# Patient Record
Sex: FEMALE | Race: WHITE | NOT HISPANIC OR LATINO | URBAN - METROPOLITAN AREA
[De-identification: names, ages, dates, MRNs, and addresses within clinical notes are randomized per-mention and may not be internally consistent; named-entity substitution may affect disease eponyms.]

---

## 2019-01-11 ENCOUNTER — INPATIENT HOSPITAL (OUTPATIENT)
Dept: URBAN - METROPOLITAN AREA HOSPITAL 95 | Facility: HOSPITAL | Age: 34
End: 2019-01-11

## 2019-01-11 DIAGNOSIS — S36.13XA INJURY OF BILE DUCT, INITIAL ENCOUNTER: ICD-10-CM

## 2019-01-11 PROCEDURE — 99223 1ST HOSP IP/OBS HIGH 75: CPT | Mod: 25 | Performed by: INTERNAL MEDICINE

## 2019-01-11 PROCEDURE — 43262 ENDO CHOLANGIOPANCREATOGRAPH: CPT | Mod: XU | Performed by: INTERNAL MEDICINE

## 2019-01-11 PROCEDURE — 43274 ERCP DUCT STENT PLACEMENT: CPT | Performed by: INTERNAL MEDICINE

## 2019-02-13 ENCOUNTER — OFFICE (OUTPATIENT)
Dept: URBAN - METROPOLITAN AREA CLINIC 14 | Facility: CLINIC | Age: 34
End: 2019-02-13
Payer: COMMERCIAL

## 2019-02-13 VITALS
HEIGHT: 61 IN | HEART RATE: 68 BPM | DIASTOLIC BLOOD PRESSURE: 71 MMHG | WEIGHT: 162 LBS | SYSTOLIC BLOOD PRESSURE: 119 MMHG

## 2019-02-13 DIAGNOSIS — R14.0 ABDOMINAL DISTENSION (GASEOUS): ICD-10-CM

## 2019-02-13 DIAGNOSIS — R10.13 EPIGASTRIC PAIN: ICD-10-CM

## 2019-02-13 DIAGNOSIS — K83.3 FISTULA OF BILE DUCT: ICD-10-CM

## 2019-02-13 LAB
IMMUNOGLOBULIN A, QN, SERUM: 464 MG/DL — HIGH (ref 87–352)
T-TRANSGLUTAMINASE (TTG) IGA: <2 U/ML

## 2019-02-13 PROCEDURE — 99215 OFFICE O/P EST HI 40 MIN: CPT | Performed by: NURSE PRACTITIONER

## 2019-02-13 NOTE — SERVICEHPINOTES
Charmaine Mahan   is a   33   year old  female   here today Follow-up of her hospitalization due to a bile duct leak.  The patient had a cholecystectomy in early January and was rehospitalized at [facility] 01/11/2019 with a bile duct leak.  She had an ERCP with stent placement.  The she was discharged home but went back to the emergency room a few weeks later due to recurrent pain and there was further testing done that did not reveal any further bile duct leaking.  Her laboratory studies were normal.  She notes some mild epigastric pain and bloating with this is not related to eating.  The epigastric pain is a cramping sensation and is intermittent in nature.  She denies any heartburn or reflux.  She does subsequently note that she has had some abdominal discomfort and bloating for several years after eating foods containing gluten.

## 2019-02-28 ENCOUNTER — ON CAMPUS - OUTPATIENT (OUTPATIENT)
Dept: URBAN - METROPOLITAN AREA HOSPITAL 97 | Facility: HOSPITAL | Age: 34
End: 2019-02-28
Payer: COMMERCIAL

## 2019-02-28 DIAGNOSIS — K29.70 GASTRITIS, UNSPECIFIED, WITHOUT BLEEDING: ICD-10-CM

## 2019-02-28 DIAGNOSIS — K83.3 FISTULA OF BILE DUCT: ICD-10-CM

## 2019-02-28 DIAGNOSIS — T85.590A OTHER MECHANICAL COMPLICATION OF BILE DUCT PROSTHESIS, INITI: ICD-10-CM

## 2019-02-28 DIAGNOSIS — K25.9 GASTRIC ULCER, UNSPECIFIED AS ACUTE OR CHRONIC, WITHOUT HEMO: ICD-10-CM

## 2019-02-28 DIAGNOSIS — S36.13XA INJURY OF BILE DUCT, INITIAL ENCOUNTER: ICD-10-CM

## 2019-02-28 PROCEDURE — 43275 ERCP REMOVE FORGN BODY DUCT: CPT | Performed by: INTERNAL MEDICINE

## 2019-02-28 PROCEDURE — 43261 ENDO CHOLANGIOPANCREATOGRAPH: CPT | Mod: 51 | Performed by: INTERNAL MEDICINE
